# Patient Record
Sex: MALE | Race: WHITE | Employment: FULL TIME | URBAN - METROPOLITAN AREA
[De-identification: names, ages, dates, MRNs, and addresses within clinical notes are randomized per-mention and may not be internally consistent; named-entity substitution may affect disease eponyms.]

---

## 2024-02-28 ENCOUNTER — OFFICE VISIT (OUTPATIENT)
Age: 34
End: 2024-02-28

## 2024-02-28 VITALS
DIASTOLIC BLOOD PRESSURE: 85 MMHG | SYSTOLIC BLOOD PRESSURE: 131 MMHG | OXYGEN SATURATION: 97 % | HEART RATE: 103 BPM | TEMPERATURE: 98.8 F | WEIGHT: 216.7 LBS

## 2024-02-28 DIAGNOSIS — J02.9 SORE THROAT: Primary | ICD-10-CM

## 2024-02-28 LAB
STREP PYOGENES DNA, POC: NEGATIVE
VALID INTERNAL CONTROL, POC: NORMAL

## 2024-02-28 ASSESSMENT — ENCOUNTER SYMPTOMS
VOICE CHANGE: 0
TROUBLE SWALLOWING: 0
RHINORRHEA: 0
COUGH: 0
SORE THROAT: 1
NAUSEA: 1
VOMITING: 0

## 2024-02-28 NOTE — PROGRESS NOTES
Chief Complaint   Patient presents with    Pharyngitis     Symptoms started yesterday; sore throat, nausea, H/A x 1  day          Pharyngitis  Location:  Throat pain  Severity:  Mild  Duration: started today.  Chronicity:  New  Associated symptoms: myalgias (mild), nausea and sore throat    Associated symptoms: no congestion, no cough, no ear pain, no fever, no headaches, no rhinorrhea and no vomiting        No past medical history on file.    No past surgical history on file.          No Known Allergies     Review of Systems   Constitutional:  Negative for fever.   HENT:  Positive for sore throat. Negative for congestion, ear pain, rhinorrhea, trouble swallowing and voice change.    Respiratory:  Negative for cough.    Gastrointestinal:  Positive for nausea. Negative for vomiting.   Musculoskeletal:  Positive for myalgias (mild).   Neurological:  Negative for headaches.   All other systems reviewed and are negative.       /85 (Site: Left Upper Arm)   Pulse (!) 103   Temp 98.8 °F (37.1 °C)   Wt 98.3 kg (216 lb 11.2 oz)   SpO2 97%      Physical Exam  Vitals and nursing note reviewed.   Constitutional:       General: He is not in acute distress.     Appearance: Normal appearance. He is not ill-appearing.   HENT:      Right Ear: Tympanic membrane normal.      Left Ear: Tympanic membrane normal.      Nose: No congestion or rhinorrhea.      Mouth/Throat:      Pharynx: No oropharyngeal exudate or posterior oropharyngeal erythema.   Eyes:      Conjunctiva/sclera: Conjunctivae normal.   Cardiovascular:      Rate and Rhythm: Normal rate.   Pulmonary:      Effort: Pulmonary effort is normal. No respiratory distress.      Breath sounds: Normal breath sounds. No wheezing, rhonchi or rales.   Lymphadenopathy:      Cervical: No cervical adenopathy.   Skin:     Findings: No rash.   Neurological:      Mental Status: He is alert.          1. Sore throat  -     AMB POC STREP GO A DIRECT, DNA PROBE     Motrin as